# Patient Record
Sex: FEMALE | Race: WHITE | NOT HISPANIC OR LATINO
[De-identification: names, ages, dates, MRNs, and addresses within clinical notes are randomized per-mention and may not be internally consistent; named-entity substitution may affect disease eponyms.]

---

## 2022-04-26 ENCOUNTER — APPOINTMENT (OUTPATIENT)
Dept: ORTHOPEDIC SURGERY | Facility: CLINIC | Age: 77
End: 2022-04-26
Payer: MEDICARE

## 2022-04-26 VITALS — BODY MASS INDEX: 27.66 KG/M2 | WEIGHT: 166 LBS | HEIGHT: 65 IN

## 2022-04-26 DIAGNOSIS — M70.21 OLECRANON BURSITIS, RIGHT ELBOW: ICD-10-CM

## 2022-04-26 DIAGNOSIS — Z85.038 PERSONAL HISTORY OF OTHER MALIGNANT NEOPLASM OF LARGE INTESTINE: ICD-10-CM

## 2022-04-26 DIAGNOSIS — Z78.9 OTHER SPECIFIED HEALTH STATUS: ICD-10-CM

## 2022-04-26 PROBLEM — Z00.00 ENCOUNTER FOR PREVENTIVE HEALTH EXAMINATION: Status: ACTIVE | Noted: 2022-04-26

## 2022-04-26 PROCEDURE — 99213 OFFICE O/P EST LOW 20 MIN: CPT

## 2022-04-26 RX ORDER — METOPROLOL TARTRATE 75 MG/1
TABLET, FILM COATED ORAL
Refills: 0 | Status: ACTIVE | COMMUNITY

## 2022-04-26 NOTE — HISTORY OF PRESENT ILLNESS
[8] : 8 [Throbbing] : throbbing [de-identified] : 4/26/22: Here for follow up \par \par 3/31/22: 75 y/o RHD female here today. She is currently undergoing immunotherapy for bladder cancer. She noticed swelling in the elbow with mild pain about one week ago. She denies injury. She is icing. She is on prednisone and took aspirin. Franco fevers chills. [FreeTextEntry1] : R elbow [de-identified] : none

## 2022-04-26 NOTE — ASSESSMENT
[FreeTextEntry1] : Right elbow olecranon bursitis with mild degenerative changes.\par Ice.\par Compression.\par Xeroform dressing changes. \par RTO 2 weeks. \par She would like to see someone in [place] office.

## 2022-04-26 NOTE — PHYSICAL EXAM
[Right] : right elbow [Pain with Extension] : pain with extension [] : light touch intact [FreeTextEntry3] : blood-tinged drainage from puncture wound over olecranon [TWNoteComboBox7] : flexion 140 degrees [TWNoteComboBox4] : extension 0 degrees

## 2022-07-24 ENCOUNTER — APPOINTMENT (OUTPATIENT)
Dept: ORTHOPEDIC SURGERY | Facility: CLINIC | Age: 77
End: 2022-07-24

## 2022-07-24 VITALS — WEIGHT: 166 LBS | BODY MASS INDEX: 27.66 KG/M2 | HEIGHT: 65 IN

## 2022-07-24 DIAGNOSIS — M54.50 LOW BACK PAIN, UNSPECIFIED: ICD-10-CM

## 2022-07-24 PROCEDURE — 72100 X-RAY EXAM L-S SPINE 2/3 VWS: CPT

## 2022-07-24 PROCEDURE — 72170 X-RAY EXAM OF PELVIS: CPT

## 2022-07-24 PROCEDURE — 99204 OFFICE O/P NEW MOD 45 MIN: CPT

## 2022-07-24 PROCEDURE — 99213 OFFICE O/P EST LOW 20 MIN: CPT

## 2022-07-24 RX ORDER — CYCLOBENZAPRINE HYDROCHLORIDE 5 MG/1
5 TABLET, FILM COATED ORAL
Qty: 30 | Refills: 0 | Status: ACTIVE | COMMUNITY
Start: 2022-07-24 | End: 1900-01-01

## 2022-07-24 RX ORDER — PREDNISONE 5 MG/1
5 TABLET ORAL
Qty: 15 | Refills: 0 | Status: ACTIVE | COMMUNITY
Start: 1900-01-01 | End: 1900-01-01

## 2022-07-24 NOTE — ASSESSMENT
[FreeTextEntry1] : Reviewed Xrays, discussed diagnosis, answered questions\par Increase prednisone dose with taper\par cyclobenzaprine\par Heat/ ICe\par FU w Spine

## 2022-07-24 NOTE — HISTORY OF PRESENT ILLNESS
[10] : 10 [9] : 9 [de-identified] : 07/24/2022: 76 year old female here for BL low back pain for 2 weeks. Began after getting out of hospital where she was mostly confined to bed rest. States she had a similar issue like this before which resolved on its own. Pain is posterolateral hip with TTP. Does not radiated down leg. No N/T. Currently on low dose prednisone for skin condition.\par \par PMH: No DM/ Blood thinners\par H/o colectomy [FreeTextEntry5] : pt states she was in the hospital in july 2022 on her back for a long period\par Pt c.o low back pain

## 2022-07-24 NOTE — IMAGING
[Spondylolithesis] : Spondylolithesis [Spondylolysis] : Spondylolysis [AP] : anteroposterior [Mild arthritis (Tonnis Grade 1)] : Mild arthritis (Tonnis Grade 1)

## 2022-08-05 ENCOUNTER — APPOINTMENT (OUTPATIENT)
Dept: ORTHOPEDIC SURGERY | Facility: CLINIC | Age: 77
End: 2022-08-05

## 2024-08-08 ENCOUNTER — APPOINTMENT (OUTPATIENT)
Dept: ORTHOPEDIC SURGERY | Facility: CLINIC | Age: 79
End: 2024-08-08

## 2024-08-08 PROBLEM — M51.9 LUMBAR DISC DISEASE: Status: ACTIVE | Noted: 2024-08-08

## 2024-08-08 PROBLEM — Z87.448 HISTORY OF RENAL FAILURE: Status: RESOLVED | Noted: 2024-08-08 | Resolved: 2024-08-08

## 2024-08-08 PROCEDURE — 99203 OFFICE O/P NEW LOW 30 MIN: CPT

## 2024-08-08 PROCEDURE — 72100 X-RAY EXAM L-S SPINE 2/3 VWS: CPT

## 2024-08-09 ENCOUNTER — APPOINTMENT (OUTPATIENT)
Dept: MRI IMAGING | Facility: CLINIC | Age: 79
End: 2024-08-09

## 2024-08-09 PROCEDURE — 72148 MRI LUMBAR SPINE W/O DYE: CPT

## 2024-08-15 ENCOUNTER — APPOINTMENT (OUTPATIENT)
Dept: PAIN MANAGEMENT | Facility: CLINIC | Age: 79
End: 2024-08-15

## 2024-08-15 VITALS — BODY MASS INDEX: 33.32 KG/M2 | WEIGHT: 200 LBS | HEIGHT: 65 IN

## 2024-08-15 DIAGNOSIS — M54.50 LOW BACK PAIN, UNSPECIFIED: ICD-10-CM

## 2024-08-15 PROCEDURE — 99204 OFFICE O/P NEW MOD 45 MIN: CPT

## 2024-08-15 NOTE — ASSESSMENT

## 2024-08-15 NOTE — PHYSICAL EXAM
[] : motor exam is non-focal throughout both lower extremities [TWNoteComboBox7] : forward flexion 60 degrees [de-identified] : extension 10 degrees

## 2024-08-15 NOTE — HISTORY OF PRESENT ILLNESS
[Lower back] : lower back [10] : 10 [3] : 3 [Intermittent] : intermittent [Leisure] : leisure [Nothing helps with pain getting better] : Nothing helps with pain getting better [Standing] : standing [Walking] : walking [FreeTextEntry1] : 08/15/2024 : Patient presents for initial evaluation. She c/o having pain on her lower back. Had Bladder and Colon cancer in 2021 with 2 ostomies. She is currently on dialysis due to CKD 3x week.  Pain in the lower back for about 2 months.  No radicular pain    Subjective Weakness: Yes Numbness/Tingling: No Bladder/Bowel dysfunction: Yes Treatments Tried: None Blood Thinners: No   Attempted modalities for current pain complaint: See above: Medications: Yes; Oxycodone- acetaminophen   Injections: No   Previous Spine Surgery: No   Imaging: MRI Lumbar Spine (8/9/24) OC: Mild scoliosis, exaggerated lumbar lordosis and multilevel degenerative disc disease.  Mild central stenosis at L4-5 and multilevel foraminal narrowing most prominent at L5-S1 left greater than right.  No acute lumbar vertebral body fracture with scattered degenerative endplate changes and anterior spondylosis.  There is an area of relative increased T2 weighted signal inversion recovery signal with relative low signal within the S4 vertebral segment measuring approximately 1.8 cm most likely representing an hemangioma without evidence of surrounding soft tissue mass.  Clinical correlation, plain film correlation and clinically indicated no body bone scan may be obtained to further evaluate as clinically indicated.   [] : Post Surgical Visit: no [FreeTextEntry6] : "just pain"  [de-identified] : OC L MRI

## 2024-08-15 NOTE — HISTORY OF PRESENT ILLNESS
[Lower back] : lower back [10] : 10 [3] : 3 [Intermittent] : intermittent [Leisure] : leisure [Nothing helps with pain getting better] : Nothing helps with pain getting better [Standing] : standing [Walking] : walking [FreeTextEntry1] : 08/15/2024 : Patient presents for initial evaluation. She c/o having pain on her lower back. Had Bladder and Colon cancer in 2021 with 2 ostomies. She is currently on dialysis due to CKD 3x week.  Pain in the lower back for about 2 months.  No radicular pain    Subjective Weakness: Yes Numbness/Tingling: No Bladder/Bowel dysfunction: Yes Treatments Tried: None Blood Thinners: No   Attempted modalities for current pain complaint: See above: Medications: Yes; Oxycodone- acetaminophen   Injections: No   Previous Spine Surgery: No   Imaging: MRI Lumbar Spine (8/9/24) OC: Mild scoliosis, exaggerated lumbar lordosis and multilevel degenerative disc disease.  Mild central stenosis at L4-5 and multilevel foraminal narrowing most prominent at L5-S1 left greater than right.  No acute lumbar vertebral body fracture with scattered degenerative endplate changes and anterior spondylosis.  There is an area of relative increased T2 weighted signal inversion recovery signal with relative low signal within the S4 vertebral segment measuring approximately 1.8 cm most likely representing an hemangioma without evidence of surrounding soft tissue mass.  Clinical correlation, plain film correlation and clinically indicated no body bone scan may be obtained to further evaluate as clinically indicated.   [] : Post Surgical Visit: no [FreeTextEntry6] : "just pain"  [de-identified] : OC L MRI

## 2024-08-15 NOTE — PHYSICAL EXAM
[] : motor exam is non-focal throughout both lower extremities [TWNoteComboBox7] : forward flexion 60 degrees [de-identified] : extension 10 degrees

## 2024-08-20 ENCOUNTER — APPOINTMENT (OUTPATIENT)
Dept: PAIN MANAGEMENT | Facility: CLINIC | Age: 79
End: 2024-08-20

## 2024-08-20 DIAGNOSIS — M47.816 SPONDYLOSIS W/OUT MYELOPATHY OR RADICULOPATHY, LUMBAR REGION: ICD-10-CM

## 2024-08-20 PROCEDURE — 64493 INJ PARAVERT F JNT L/S 1 LEV: CPT | Mod: 1L,LT

## 2024-08-20 PROCEDURE — 64494 INJ PARAVERT F JNT L/S 2 LEV: CPT | Mod: 1L,59,LT

## 2024-08-20 NOTE — PROCEDURE
"Goal Outcome Evaluation:  Plan of Care Reviewed With: patient           Outcome Evaluation: Pt is resting in bed with eyes closed. Chest is rising and falling evenly. No s/s of acute distress noted. Pt had complaints of pain \"everywhere\" even after administering pain meds. See Mar. Made JORDAN Sanchez aware. Gave Ibuprofen. See orders/mar. Pt is resting comfortably in bed.         " [FreeTextEntry3] : Date of Service: 08/20/2024   Account: [id number]   Patient: ANDRES SKINNER   YOB: 1945   Age: 78 year     Surgeon:                                      Cheryl Montes M.D.   Assistant:                                    None.   Pre-Operative Diagnosis:            Spondylosis of Lumbar Region without Myelopathy or Radiculopathy (M47.816)      Post Operative Diagnosis:        Same    Procedure:                 Right L4-5, L5-S1 Facet block                                       Left L4-5, L5-S1 Facet block under fluoroscopic guidance    Anesthesia:                None     This procedure was carried out using fluoroscopic guidance.  The risks and benefits of the procedure were discussed extensively with the patient.  The consent of the patient was obtained and the following procedure was performed.   The patient was placed in the prone position.  The patient's back was prepped and draped in a sterile fashion.  The left L4 and L5 lumbar vertebral bodies were identified and the fluoroscope left obliqued to approximately 30 degrees to reveal good "Brant-dog" anatomical view.  The junction of the superior articulate process and tranverse process at the L4 and L5 level was then identified and marked. The skin at these target points was then localized using 1 cc of 1% Lidocaine without epinephrine at each injection site.  A spinal needle was then introduced and advanced to the above target points at the junction of the SAP and transverse processes until oss was contacted.  After negative aspiration for heme and CSF, an injectate of 1cc 0.25% marcaine and 10mg of methylprednisolone acetate was injected at each of the two levels.   The right L4 and L5 lumbar vertebral bodies were identified and the fluoroscope right obliqued to approximately 30 degrees to reveal good "Brant-dog" anatomical view.  The junction of the superior articulate process and tranverse process at the L4 and L5 level was then identified and marked. The skin at these target points was then localized using 1 cc of 1% Lidocaine without epinephrine at each injection site.  A spinal needle was then introduced and advanced to the above target points at the junction of the SAP and transverse processes until oss was contacted.  After negative aspiration for heme and CSF, an injectate of 1cc 0.25% marcaine and 10mg of methylprednisolone acetate was injected at each of the two levels.   Fluoroscope then focused on the bilateral sacral ala on AP view, and marked at these points.  The skin and subcutaneous structures were localized using 1cc of 1.0 % lidocaine without epinephrine.  A spinal needle was then advanced under fluoroscopic guidance until oss was contacted at the ala bilaterally.  After negative aspiration for heme and CSF, an injectate of 1cc 0.25% marcaine and 10mg of methylprednisolone acetate was injected at each site.   The needles were then removed and pressure was applied.  Vitals were monitored throughout.    Cheryl Montes M.D.

## 2024-09-05 ENCOUNTER — APPOINTMENT (OUTPATIENT)
Dept: PAIN MANAGEMENT | Facility: CLINIC | Age: 79
End: 2024-09-05

## 2024-09-05 VITALS — WEIGHT: 200 LBS | HEIGHT: 65 IN | BODY MASS INDEX: 33.32 KG/M2

## 2024-09-05 DIAGNOSIS — M47.816 SPONDYLOSIS W/OUT MYELOPATHY OR RADICULOPATHY, LUMBAR REGION: ICD-10-CM

## 2024-09-05 PROCEDURE — 99213 OFFICE O/P EST LOW 20 MIN: CPT

## 2024-09-05 NOTE — HISTORY OF PRESENT ILLNESS
[Lower back] : lower back [10] : 10 [3] : 3 [Intermittent] : intermittent [Leisure] : leisure [Nothing helps with pain getting better] : Nothing helps with pain getting better [Standing] : standing [Walking] : walking [FreeTextEntry1] : 9/5/24- fu for B/L lumbar MBB on 8/20 with 70% relief.  Due to her co-morbities and dialysis schedule she should not have to have the second MBB.  She had great relief from MBB #1.  Would benefit from RFA.    08/15/2024 : Patient presents for initial evaluation. She c/o having pain on her lower back. Had Bladder and Colon cancer in 2021 with 2 ostomies. She is currently on dialysis due to CKD 3x week.  Pain in the lower back for about 2 months.  No radicular pain    Subjective Weakness: Yes Numbness/Tingling: No Bladder/Bowel dysfunction: Yes Treatments Tried: None Blood Thinners: No   Attempted modalities for current pain complaint: See above: Medications: Yes; Oxycodone- acetaminophen   Injections: No  8/20/24 B/L lumbar MBB Previous Spine Surgery: No   Imaging: MRI Lumbar Spine (8/9/24) OC: Mild scoliosis, exaggerated lumbar lordosis and multilevel degenerative disc disease.  Mild central stenosis at L4-5 and multilevel foraminal narrowing most prominent at L5-S1 left greater than right.  No acute lumbar vertebral body fracture with scattered degenerative endplate changes and anterior spondylosis.  There is an area of relative increased T2 weighted signal inversion recovery signal with relative low signal within the S4 vertebral segment measuring approximately 1.8 cm most likely representing an hemangioma without evidence of surrounding soft tissue mass.  Clinical correlation, plain film correlation and clinically indicated no body bone scan may be obtained to further evaluate as clinically indicated.   [7] : 7 [] : Post Surgical Visit: no [FreeTextEntry6] : "just pain"  [de-identified] : OC L MRI

## 2024-09-05 NOTE — PHYSICAL EXAM
[] : no lumbar paraspinal tenderness [TWNoteComboBox7] : forward flexion 60 degrees [de-identified] : extension 10 degrees

## 2024-09-05 NOTE — HISTORY OF PRESENT ILLNESS
[Lower back] : lower back [10] : 10 [3] : 3 [Intermittent] : intermittent [Leisure] : leisure [Nothing helps with pain getting better] : Nothing helps with pain getting better [Standing] : standing [Walking] : walking [FreeTextEntry1] : 9/5/24- fu for B/L lumbar MBB on 8/20 with 70% relief.  Due to her co-morbities and dialysis schedule she should not have to have the second MBB.  She had great relief from MBB #1.  Would benefit from RFA.    08/15/2024 : Patient presents for initial evaluation. She c/o having pain on her lower back. Had Bladder and Colon cancer in 2021 with 2 ostomies. She is currently on dialysis due to CKD 3x week.  Pain in the lower back for about 2 months.  No radicular pain    Subjective Weakness: Yes Numbness/Tingling: No Bladder/Bowel dysfunction: Yes Treatments Tried: None Blood Thinners: No   Attempted modalities for current pain complaint: See above: Medications: Yes; Oxycodone- acetaminophen   Injections: No  8/20/24 B/L lumbar MBB Previous Spine Surgery: No   Imaging: MRI Lumbar Spine (8/9/24) OC: Mild scoliosis, exaggerated lumbar lordosis and multilevel degenerative disc disease.  Mild central stenosis at L4-5 and multilevel foraminal narrowing most prominent at L5-S1 left greater than right.  No acute lumbar vertebral body fracture with scattered degenerative endplate changes and anterior spondylosis.  There is an area of relative increased T2 weighted signal inversion recovery signal with relative low signal within the S4 vertebral segment measuring approximately 1.8 cm most likely representing an hemangioma without evidence of surrounding soft tissue mass.  Clinical correlation, plain film correlation and clinically indicated no body bone scan may be obtained to further evaluate as clinically indicated.   [7] : 7 [] : Post Surgical Visit: no [FreeTextEntry6] : "just pain"  [de-identified] : OC L MRI

## 2024-09-05 NOTE — PHYSICAL EXAM
[] : no lumbar paraspinal tenderness [TWNoteComboBox7] : forward flexion 60 degrees [de-identified] : extension 10 degrees

## 2024-09-24 ENCOUNTER — APPOINTMENT (OUTPATIENT)
Dept: PAIN MANAGEMENT | Facility: CLINIC | Age: 79
End: 2024-09-24

## 2024-09-24 DIAGNOSIS — M47.816 SPONDYLOSIS W/OUT MYELOPATHY OR RADICULOPATHY, LUMBAR REGION: ICD-10-CM

## 2024-09-24 PROCEDURE — 64636Z: CUSTOM | Mod: 59,50

## 2024-09-24 PROCEDURE — 64635 DESTROY LUMB/SAC FACET JNT: CPT | Mod: 1L,LT

## 2024-09-24 NOTE — PROCEDURE
[FreeTextEntry3] : Date of Service: 09/24/2024   Account: [id number]   Patient: ANDRES SKINNER   YOB: 1945   Age: 79 year     PREOPERATIVE DIAGNOSIS: Spondylosis of Lumbar Region without Myelopathy or Radiculopathy (M47.816)       1.           POSTOPERATIVE DIAGNOSIS: Spondylosis of Lumbar Region without Myelopathy or Radiculopathy (M47.816)       1.           PROCEDURE:           1) Right L3, L4, L5 and Left L3, L4, L5 medial branch radiofrequency ablation under fluoroscopic guidance.                         Anesthesia:                                                      None     Risks, benefits and alternatives of the procedure were discussed with the patient after which they agreed to proceed.  Patient was brought into fluoroscopy suite and was placed in prone position with hip support. Back was prepped and draped in a sterile fashion.   Under AP visualization, the right and left sacral ala was identified and marked. Using a 25 gauge  inch needle the skin and subcutaneous structures at this point were localized with 1% Lidocaine using approximately 3 cc's 1% Lidocaine.  After this, a 20 gauge 100mm Eduar radiofrequency needle with a 10mm curved tip was inserted and using depth direction depth technique under constant fluoroscopic visualization, the needle was advanced to the sacral ala until os was contacted.   The camera was then redirected under AP view to visualize the right and left L4 and L5 vertebra. The camera was obliqued to approximately 30 degrees to reveal good Brant dog anatomical view. The junction of the superior articulate process and transverse process at the L4 and L5 levels  were then identified and marked. Skin and subcutaneous structures were then anesthetized with approximately 3 cc's of 1% Lidocaine at each of these levels. After which a 20 gauge 100mm Eduar radiofrequency needle with a 10mm curved tip then advanced until the junction at the SAP and transverse process was met.  The camera was then directed through the lateral view and under constant fluoroscopic visualization, the needle tips were then advanced and confirmed at the junction of the SAP and transverse process.   The stylette for the most cephalad needle at the L4 level was then removed and a Eduar radiofrequency probe was then placed inside the needle. After their pedis' were checked at approximately 300 ohm, 50 hertz sensory stimulation was performed.  Patient experienced concordant pain in their low back at approximately 0.3 volts. The voltage was then increased to 1 volt. The patient reported increased low back pain symptoms without any radiation below the knee.  Stimulation was then changed to 2 hertz and increased slowly by .1 volt increments at approximately 0.5 volts, patient began to experience thumping like reproductive pain in their low back. The voltage was then increased to approximately 2.5 volts. Patient experienced increasing thumping without any sensation below their knee. This exact stimulation was then repeated for the L5 needle as well as sacral ala needle with concordant pain and no radiation below the knee. The 6 levels were then anesthetized with approximately 0.5 cc's of 1% Lidocaine. After which each area was then ablated at 80 degrees centigrade for 90 seconds each. Patient felt no pain reproduction during the ablation procedure.  After each of these levels were ablated and injected approximately 1 cc of 0.25% Bupivacaine plus 80 mg of Kenalog were then injected before the needles were removed.  Pressure was then applied to the low back. Band-aids were applied.  Patient was brought to the recovery, ambulated on their own after the procedure and reported decreased low back pain.    Patient was told to apply ice to the low back for 20 minutes on and 20 minutes off for focal symptoms for 24-48 hours. They should call the office if they have any questions or concerns.   Cheryl Montes M.D.

## 2024-10-10 ENCOUNTER — APPOINTMENT (OUTPATIENT)
Dept: PAIN MANAGEMENT | Facility: CLINIC | Age: 79
End: 2024-10-10
Payer: MEDICARE

## 2024-10-10 VITALS — BODY MASS INDEX: 33.49 KG/M2 | WEIGHT: 201 LBS | HEIGHT: 65 IN

## 2024-10-10 DIAGNOSIS — M47.816 SPONDYLOSIS W/OUT MYELOPATHY OR RADICULOPATHY, LUMBAR REGION: ICD-10-CM

## 2024-10-10 PROCEDURE — 99213 OFFICE O/P EST LOW 20 MIN: CPT

## 2024-10-10 RX ORDER — APIXABAN 5 MG/1
5 TABLET, FILM COATED ORAL
Refills: 0 | Status: ACTIVE | COMMUNITY

## 2024-10-10 RX ORDER — DIGOXIN 250 UG/1
TABLET ORAL
Refills: 0 | Status: ACTIVE | COMMUNITY

## 2024-11-14 ENCOUNTER — APPOINTMENT (OUTPATIENT)
Dept: PAIN MANAGEMENT | Facility: CLINIC | Age: 79
End: 2024-11-14

## 2024-11-14 DIAGNOSIS — M47.816 SPONDYLOSIS W/OUT MYELOPATHY OR RADICULOPATHY, LUMBAR REGION: ICD-10-CM

## 2024-11-14 PROCEDURE — J3490M: CUSTOM

## 2024-11-14 PROCEDURE — 20552 NJX 1/MLT TRIGGER POINT 1/2: CPT

## 2024-12-12 ENCOUNTER — APPOINTMENT (OUTPATIENT)
Dept: PAIN MANAGEMENT | Facility: CLINIC | Age: 79
End: 2024-12-12

## 2024-12-12 VITALS — BODY MASS INDEX: 33.66 KG/M2 | WEIGHT: 202 LBS | HEIGHT: 65 IN

## 2024-12-12 DIAGNOSIS — M51.9 UNSPECIFIED THORACIC, THORACOLUMBAR AND LUMBOSACRAL INTERVERTEBRAL DISC DISORDER: ICD-10-CM

## 2024-12-12 PROCEDURE — J3490M: CUSTOM

## 2024-12-12 PROCEDURE — 20552 NJX 1/MLT TRIGGER POINT 1/2: CPT

## 2025-02-20 ENCOUNTER — APPOINTMENT (OUTPATIENT)
Dept: PAIN MANAGEMENT | Facility: CLINIC | Age: 80
End: 2025-02-20

## 2025-03-06 ENCOUNTER — APPOINTMENT (OUTPATIENT)
Dept: PAIN MANAGEMENT | Facility: CLINIC | Age: 80
End: 2025-03-06
Payer: MEDICARE

## 2025-03-06 DIAGNOSIS — M54.50 LOW BACK PAIN, UNSPECIFIED: ICD-10-CM

## 2025-03-06 PROCEDURE — J3490M: CUSTOM | Mod: JZ

## 2025-03-06 PROCEDURE — 62323 NJX INTERLAMINAR LMBR/SAC: CPT

## 2025-03-20 ENCOUNTER — APPOINTMENT (OUTPATIENT)
Dept: PAIN MANAGEMENT | Facility: CLINIC | Age: 80
End: 2025-03-20
Payer: MEDICARE

## 2025-03-20 VITALS — WEIGHT: 207 LBS | HEIGHT: 65 IN | BODY MASS INDEX: 34.49 KG/M2

## 2025-03-20 DIAGNOSIS — M51.9 UNSPECIFIED THORACIC, THORACOLUMBAR AND LUMBOSACRAL INTERVERTEBRAL DISC DISORDER: ICD-10-CM

## 2025-03-20 PROCEDURE — 99213 OFFICE O/P EST LOW 20 MIN: CPT
